# Patient Record
Sex: MALE | Race: WHITE | NOT HISPANIC OR LATINO | Employment: FULL TIME | URBAN - METROPOLITAN AREA
[De-identification: names, ages, dates, MRNs, and addresses within clinical notes are randomized per-mention and may not be internally consistent; named-entity substitution may affect disease eponyms.]

---

## 2017-01-10 ENCOUNTER — DOCUMENTATION ONLY (OUTPATIENT)
Dept: SLEEP MEDICINE | Facility: CLINIC | Age: 42
End: 2017-01-10

## 2017-01-10 NOTE — PROGRESS NOTES
30 DAY [facility] VISIT    Message left for patient to return call     Assessment: Pt meeting objective benchmarks.     Action plan: Waiting for patient to return call.  and Pt to have 6 month [facility] visit  Patient has a follow up visit with Dr. Culp on 1/18/2017.   Device settings:    EPAP Min Auto CPAP: 5.0 (The minimum allowable pressure in cmH2O)    EPAP Max Auto CPAP/: 15.0 (The maximum allowable pressure in cmH2O)    Target (95% of Target) 14 day average (Resmed): 8.3cm H20    Objective measures: 14 day rolling measures     Compliance   (Goal >70%)  --% compliance greater than four hours rolling average 14 days: 92.8 %      Leak   (Goal < 24 lpm) --95% OF Leak in litres Rolling Average 14 Days: 8.49 lpm  last data upload      AHI  (Goal < 5)  --AHI Rolling Average 14 Day: 0.92 last data upload        Usage  (Goal >240)  --Time mask on face 14 day average: 430 min

## 2017-01-12 ENCOUNTER — TEAM CONFERENCE (OUTPATIENT)
Dept: SLEEP MEDICINE | Facility: CLINIC | Age: 42
End: 2017-01-12

## 2017-01-12 NOTE — TELEPHONE ENCOUNTER
I called the patient and left a voice message that I am unavailable to see him for his f/u appointment for f/u on CPAP use on 1/18/17. I briefly( mentioned that the compliance DL looks good.  I have instructed him to call the clinic at [phone] to reschedule the visit, if he is interested.    (Compliance DL was personally reviewed  By me including the STM notes. Good compliance with CPAP and residual AHI 0.92 per hr, YESENIA is well controlled with CPAP; recommend him to continue the CPAP use regularly during sleep and get the supplies replaced regularly. He is scheduled for [facility] call in 6 months).    Sarah Culp MD   of Medicine,  Division of Pulmonary/Sleep Medicine  [facility].

## 2017-08-25 ENCOUNTER — OFFICE VISIT (OUTPATIENT)
Dept: UROLOGY | Facility: CLINIC | Age: 42
End: 2017-08-25
Payer: COMMERCIAL

## 2017-08-25 VITALS — SYSTOLIC BLOOD PRESSURE: 112 MMHG | HEART RATE: 66 BPM | DIASTOLIC BLOOD PRESSURE: 76 MMHG | RESPIRATION RATE: 11 BRPM

## 2017-08-25 PROCEDURE — 99212 OFFICE O/P EST SF 10 MIN: CPT | Performed by: UROLOGY

## 2017-08-25 RX ORDER — TADALAFIL 20 MG/1
20 TABLET ORAL DAILY PRN
Qty: 30 TABLET | Refills: 3 | Status: SHIPPED | OUTPATIENT
Start: 2017-08-25 | End: 2021-04-22

## 2017-08-25 NOTE — NURSING NOTE
Chief Complaint   Patient presents with     RECHECK       Initial /76 (BP Location: Right arm, Patient Position: Chair, Cuff Size: Adult Regular)  Pulse 66  Resp 11 Estimated body mass index is 27.29 kg/(m^2) as calculated from the following:    Height as of 11/16/16: 1.829 m (6').    Weight as of 11/16/16: 91.3 kg (201 lb 3.2 oz).  Medication Reconciliation: complete   Aida Rubin RN

## 2017-08-25 NOTE — PROGRESS NOTES
Chief Complaint   Patient presents with     RECHECK       Mik León is a 41 year old male who presents today for follow up of   Chief Complaint   Patient presents with     RECHECK    f/u for ED.  He has been using cialis with good results.  He denies any side effects from medication.  He denies any CP/SOB.  He uses 5 mg of cialis usually over the weekend.      Current Outpatient Prescriptions   Medication Sig Dispense Refill     tadalafil (CIALIS) 20 MG tablet Take 1 tablet (20 mg) by mouth daily as needed for erectile dysfunction 30 tablet 3     order for DME Equipment being ordered: CPAP Equipment ordered: RESMED Auto PAP Mask type: Nasal Settings: 5-15 CM H2O       tadalafil (CIALIS) 5 MG tablet Take 1 tablet (5 mg) by mouth as needed for erectile dysfunction 30 tablet 0     [DISCONTINUED] tadalafil (CIALIS) 20 MG tablet Take 1 tablet (20 mg) by mouth daily as needed for erectile dysfunction 30 tablet 3     Allergies   Allergen Reactions     Penicillin G Rash      No past medical history on file.  No past surgical history on file.  No family history on file.  History     Social History     Marital Status:      Spouse Name: N/A     Number of Children: N/A     Years of Education: N/A     Social History Main Topics     Smoking status: Never Smoker      Smokeless tobacco: Never Used     Alcohol Use: None     Drug Use: None     Sexually Active: None     Other Topics Concern     None     Social History Narrative       REVIEW OF SYSTEMS  =================  C: NEGATIVE for fever, chills, change in weight  I: NEGATIVE for worrisome rashes, moles or lesions  E/M: NEGATIVE for ear, mouth and throat problems  R: NEGATIVE for significant cough or SHORTNESS OF BREATH,   CV: NEGATIVE for chest pain, palpitations or peripheral edema  GI: NEGATIVE for nausea, abdominal pain, heartburn, or change in bowel habits  NEURO: NEGATIVE any motor/sensory changes  PSYCH: NEGATIVE for recent mood disorder    Physical Exam:  BP  112/76 (BP Location: Right arm, Patient Position: Chair, Cuff Size: Adult Regular)  Pulse 66  Resp 11   Patient is pleasant, in no acute distress, good general condition.  Lung: no evidence of respiratory distress    Abdomen: Soft, nondistended, non tender. No masses. No rebound or guarding.   Exam: no cva tenderness  Skin: Warm and dry.  No redness.  Psych: normal mood and affect  Neuro: alert and oriented    Assessment/Plan:   (794.62) Male impotence  Comment:    Plan: tadalafil (CIALIS) 5 MG tablet and 20 mg prn           See in 1 year.

## 2017-08-25 NOTE — MR AVS SNAPSHOT
"              After Visit Summary   2017    Mik León    MRN: [id number]           Patient Information     Date Of Birth          1975        Visit Information        Provider Department      2017 8:00 AM Alex Posada MD [facility]        Today's Diagnoses     Male impotence           Follow-ups after your visit        Who to contact     If you have questions or need follow up information about today's clinic visit or your schedule please contact [facility] directly at [phone].  Normal or non-critical lab and imaging results will be communicated to you by MyChart, letter or phone within 4 business days after the clinic has received the results. If you do not hear from us within 7 days, please contact the clinic through Tingzhart or phone. If you have a critical or abnormal lab result, we will notify you by phone as soon as possible.  Submit refill requests through ITema or call your pharmacy and they will forward the refill request to us. Please allow 3 business days for your refill to be completed.          Additional Information About Your Visit        MyChart Information     ITema lets you send messages to your doctor, view your test results, renew your prescriptions, schedule appointments and more. To sign up, go to [email or web address] . Click on \"Log in\" on the left side of the screen, which will take you to the Welcome page. Then click on \"Sign up Now\" on the right side of the page.     You will be asked to enter the access code listed below, as well as some personal information. Please follow the directions to create your username and password.     Your access code is: [email or web address]  Expires: 2017  8:22 AM     Your access code will  in 90 days. If you need help or a new code, please call your [facility] or [phone].        Care EveryWhere ID     This is your Care EveryWhere ID. This could be used by other organizations to " access your [place] medical records  [id number]        Your Vitals Were     Pulse Respirations                66 11           Blood Pressure from Last 3 Encounters:   08/25/17 112/76   12/06/16 120/74   11/16/16 113/55    Weight from Last 3 Encounters:   11/16/16 91.3 kg (201 lb 3.2 oz)   08/06/14 104.7 kg (230 lb 12.8 oz)   09/07/12 106.6 kg (235 lb)              Today, you had the following     No orders found for display         Where to get your medicines      These medications were sent to [id number] - [place], [place] - [address] AT [facility] [address]  [address], [place] [address]     Phone:  [phone]     tadalafil 20 MG tablet          Primary Care Provider Office Phone # Fax #    Callum Silva [phone]       [address] [facility] [address]        Equal Access to Services     LATRELL LEWIS : Hadii aad ku hadasho Soomaali, waaxda luqadaha, qaybta kaalmada adeegyada, waxay idiin hayaan gaileg kharajeannie goldberg . So [facility] [phone].    ATENCIÓN: Si habla español, tiene a pereira disposición servicios gratuitos de asistencia lingüística. Llame al [phone].    We comply with applicable federal civil rights laws and [place] laws. We do not discriminate on the basis of race, color, national origin, age, disability sex, sexual orientation or gender identity.            Thank you!     Thank you for choosing [facility]  for your care. Our goal is always to provide you with excellent care. Hearing back from our patients is one way we can continue to improve our services. Please take a few minutes to complete the written survey that you may receive in the mail after your visit with us. Thank you!             Your Updated Medication List - Protect others around you: Learn how to safely use, store and throw away your medicines at www.disposemymeds.org.          This list is accurate as of: 8/25/17  8:22 AM.   Always use your most recent med list.                   Brand Name Dispense Instructions for use Diagnosis    order for DME      Equipment being ordered: CPAP Equipment ordered: RESMED Auto PAP Mask type: Nasal Settings: 5-15 CM H2O        * tadalafil 5 MG tablet    CIALIS    30 tablet    Take 1 tablet (5 mg) by mouth as needed for erectile dysfunction    Male impotence       * tadalafil 20 MG tablet    CIALIS    30 tablet    Take 1 tablet (20 mg) by mouth daily as needed for erectile dysfunction    Male impotence       * Notice:  This list has 2 medication(s) that are the same as other medications prescribed for you. Read the directions carefully, and ask your doctor or other care provider to review them with you.

## 2018-05-25 ENCOUNTER — TELEPHONE (OUTPATIENT)
Dept: UROLOGY | Facility: CLINIC | Age: 43
End: 2018-05-25

## 2018-05-25 NOTE — TELEPHONE ENCOUNTER
PA Initiation    Medication: cilais 20mg  Insurance Company: [employer] - Phone [phone] Fax [phone]  Pharmacy Filling the Rx: [id number] - [place], [place] - [address] AT [facility] [address]  Filling Pharmacy Phone: [phone]  Filling Pharmacy Fax:    Start Date: 5/25/2018    Central Prior Authorization Team   Phone: [phone]

## 2018-05-25 NOTE — TELEPHONE ENCOUNTER
Prior Authorization Retail Medication Request    Medication/Dose: Cialis 20 mg   ICD code (if different than what is on RX):  N52.9 Male impotence   Previously Tried and Failed:  NA  Rationale:  Working well for him     Insurance Name:  [employer]   Insurance ID: [id number]       Pharmacy Information (if different than what is on RX)  Name:    Phone:

## 2018-05-29 NOTE — TELEPHONE ENCOUNTER
Prior Authorization Approval    Authorization Effective Date: 4/25/2018  Authorization Expiration Date: 5/25/2019  Medication: cilais 20mg Approved  Approved Dose/Quantity: 30/30 days  Reference #: [id number]   Insurance Company: [employer] - Phone [phone] Fax [phone]  Which Pharmacy is filling the prescription (Not needed for infusion/clinic administered): [id number] - [place], [place] - [address] AT [facility] [address] [facility]  Pharmacy Notified: Yes  Patient Notified: Yes

## 2021-03-23 ENCOUNTER — NURSE TRIAGE (OUTPATIENT)
Dept: NURSING | Facility: CLINIC | Age: 46
End: 2021-03-23

## 2021-03-23 NOTE — TELEPHONE ENCOUNTER
Mik would like to schedule an appointment with PCP/provider. Needs a recent visit with provider as he needs CPAAP supplies (base covering for his face).    Has not seen primary care for years. Seen by Sleep MD in 2017.  CPAP machine brand: Resmed  Has an account with  [facility] [employer].    Per [facility] - needs:  - active prescription for CPAP supplies  - new visit with sleep provider/PCP for a new Rx    Also would have patient call them with updated address and insurance information.  Unsure if needing sleep study, staff said he might need one.    FNA called Bill with information and he verbalized understanding. FNA recommended to get seen directly by sleep MD. Warm transferred to  for sleep appointment.    Vandana Rowan RN/[place] Nurse Advisor               Reason for Disposition    Nursing judgment    Protocols used: NO GUIDELINE OR REFERENCE [id number]

## 2021-04-04 ENCOUNTER — HEALTH MAINTENANCE LETTER (OUTPATIENT)
Age: 46
End: 2021-04-04

## 2021-04-22 VITALS — WEIGHT: 201 LBS | BODY MASS INDEX: 27.22 KG/M2 | HEIGHT: 72 IN

## 2021-04-22 ASSESSMENT — MIFFLIN-ST. JEOR: SCORE: 1829.73

## 2021-04-22 NOTE — PROGRESS NOTES
Vinod is a 45 year old who is being evaluated via a billable telephone visit.      What phone number would you like to be contacted at? [phone]  How would you like to obtain your AVS? Arnaldoharmason     Telephone visit start time: 9:43 AM  Telephone visit end time: 10:04 AM      [facility]  Sleep Consultation Note     Date on this visit: 2021    Mik León is sent by No ref. provider found for a sleep consultation regarding sleep apnea    Primary Physician: Callum Silva     Chief complaint: want new supplies for CPAP and wants a nasal pillow mask    Mik León 45 year old male with h/o severe YESENIA diagnosed in 2016, who presents to sleep clinic for telephone visit today for follow-up of previously diagnosed obstructive sleep apnea. He requests prescription for new CPAP supplies and wants to obtain nasal pillow mask.  He has had a previous sleep study.    Sleep Disordered Breathing  He has only been using CPAP intermittently and stopped using since .  He may used it one other time during last winter. His mask is old, full face mask. Has not had replaced since he initially obtained.  Mask does not seal well. Wants to try nasal pillow mask.  Had positive benefits, with better sleep quality with CPAP.  There were no reports of snoring while he was using CPAP.  He tolerated the pressure setting well, it was really the mask part of it which he did not like.  He is hoping that if he has  nasal pillow mask that his compliance will improve.    Without CPAP, he reports snoring and witnessed apneas. He denies waking up gasping/choking.   He reports non-refreshing sleep.and occasional fatigue.  No EDS. No drowsiness driving.    Weight during PSlbs  Current weight:210 lbs(pt reported)    Previous sleep study report:  Study Date: 16               Weight : 201.0 lbs.    Diagnostic PSG    Sleep Architecture: Severe sleep disruption attributable to respiratory events without hypoxemia.    The total recording time of the diagnostic portion of the study was 209.8 minutes.  The total sleep time was 164.0 minutes.  During the diagnostic portion of the study the sleep latency was normal  at 14.3 minutes without the use of a sleep aid.  REM latency was 59.5 minutes.  Arousal index was increased at 40.2 arousals per hour.  Sleep efficiency was decreased at 78.2%.  Wake after sleep onset was 29.0 minutes.   The patient spent 12.2% of total sleep time in Stage N1, 60.4% in Stage N2, 11.0% in Stage N3 and 16.5% in REM.        Respiration: Severe obstructive sleep apnea.    Events - During the diagnostic portion of the study, the polysomnogram revealed a presence of 100 obstructive, 6 central, and 2 mixed apneas resulting in an apnea index of 39.5 events per hour.  There were 41 hypopneas resulting in a hypopnea index of 15.0 events per hour.  The combined apnea/hypopnea index was 54.5 events per hour.  The REM AHI was 51.1 events per hour.  The supine AHI was 76.0 events per hour.  The RERA index was - events per hour.  The RDI was 54.5 events per hour.     Snoring - was reported as loud\intermittent.    Respiratory rate and pattern - was notable for normal respiratory rate and pattern.    Sustained Sleep Associated Hypoventilation - Transcutaneous carbon dioxide monitoring was not used, however significant hypoventilation was not suggested by oximetry.    Sleep Associated Hypoxemia - (Greater than 5 minutes O2 sat below 89%) was not present.  Baseline oxygen saturation was 94.7%. Lowest oxygen saturation was 79.9%.  Time spent less than or equal to 88% was 3.6 minutes.  Time spent less than or equal to 89% was 6.2 minutes.     Treatment PSG  Sleep Architecture: Normalization of sleep continuity on CPAP.  At 02:14:51 AM the patient was placed on CPAP treatment and was titrated at pressures ranging from 5 cmH2O up to 5 cmH2O.  The total recording time of the treatment portion of the study was 225.0 minutes.   The total sleep time was 191.5 minutes.  During the treatment portion of the study the sleep latency was 12.0 minutes.  REM latency was 73.5 minutes.  Arousal index was normal at 8.5 arousals per hour.  Sleep efficiency was mildly decreased at 85.1%.  Wake after sleep onset was 21.5 minutes.  The patient spent 4.7% of total sleep time in Stage N1, 47.3% in Stage N2, 16.4% in Stage N3 and 31.6% in REM.        Respiration: Effective treatment of sleep apnea with CPAP.  The optimal pressure was 5 cmH2O with an AHI of 4.1 events per hour.  Time in REM supine on final pressure was 60.0 minutes.   This titration was considered optimal (residual AHI < 5 events per hour and including REM-supine sleep at final pressure).      Movement Activity: No abnormal sleep movements     Cardiac Summary: Normal sinus rhythm.    Assessment:     Severe obstructive sleep apnea with sleep disruption effectively treated with CPAP    DOWNLOADABLE COMPLIANCE DATA:   From 1/22/20 through 2/20/2020 reveals that he used the device for 6/30 days with an averag use of 7.33 hrs on the days used. 95th percentile on leak was 9 Lit/mt. Residual AHI was 1.7 per hour. Median pressure settings:7.9  ; 95th percentile :9.4 and max pressure: 10.4 cm water    Sleep Schedule/Sleep Complaints//Daytime Functioning  During workdays, iMk goes to bed at 1030PM and wakes up at 730AM.  It usually takes 5 minutes to fall asleep.  On non-work days, He falls asleep at 12 [place] and gets up 8 AM.  He wakes up 1-2 times throughout the night.  Night time awakenings occurs for no apparent reason, once in a while uses bathroom.  After awakening, He is able to fall back asleep right away.  Patient denies drowsiness while driving   Mik does not nap    SLEEP SCALES:  Patient's [place] Sleepiness score 2/24   Insomnia severity index:0    Restless Legs symptoms  Mik does not complain of restlessness feelings in the legs.    Sleep Behaviors  He denies any cataplexy, sleep  hallucinations.  Reported very infrequent sleep paralysis  He denies any night time behaviors - sleep walking, sleep talking, sleep eating.  He does not complain acting out dreams.    Social History  Mik currently works full time M-Fri 8am-5pm.  , Lives with wife and two children  He drinks coffee about 1 drink/day. Last caffeine intake is sometimes within 6 hours of bed time.  He drinks alcohol, 1-2 drinks/month  Does not use alcohol as a sleep aid.  Patient is a never smoker. Patient does not use drugs. Does not use medical marijuana      Allergies:    Allergies   Allergen Reactions     Penicillin G Rash       Medications:    Current Outpatient Medications   Medication Sig Dispense Refill     order for DME Equipment being ordered: CPAP Equipment ordered: RESMED Auto PAP Mask type: Nasal Settings: 5-15 CM H2O         Problem List:  Patient Active Problem List    Diagnosis Date Noted     Severe obstructive sleep apnea 11/29/2016     Priority: Medium     Male impotence 10/05/2012     Priority: Medium     CARDIOVASCULAR SCREENING; LDL GOAL LESS THAN 160 10/31/2010     Priority: Medium        Past Medical/Surgical History:  YESENIA      Social History:  Social History     Socioeconomic History     Marital status:      Spouse name: Not on file     Number of children: Not on file     Years of education: Not on file     Highest education level: Not on file   Occupational History     Not on file   Social Needs     Financial resource strain: Not on file     Food insecurity     Worry: Not on file     Inability: Not on file     Transportation needs     Medical: Not on file     Non-medical: Not on file   Tobacco Use     Smoking status: Never Smoker     Smokeless tobacco: Never Used   Substance and Sexual Activity     Alcohol use: Not on file     Drug use: Not on file     Sexual activity: Not on file   Lifestyle     Physical activity     Days per week: Not on file     Minutes per session: Not on file     Stress:  Not on file   Relationships     Social connections     Talks on phone: Not on file     Gets together: Not on file     Attends [employer]alian service: Not on file     Active member of club or organization: Not on file     Attends meetings of clubs or organizations: Not on file     Relationship status: Not on file     Intimate partner violence     Fear of current or ex partner: Not on file     Emotionally abused: Not on file     Physically abused: Not on file     Forced sexual activity: Not on file   Other Topics Concern     Not on file   Social History Narrative     Not on file       Family History:  No family history on file.    Review of Systems:  Question: GENERAL HEALTH SYMPTOMS  Answer:   No     Question: SKIN SYMPTOMS  Answer:   No     Question: HEAD, EARS, NOSE AND THROAT SYMPTOMS  Answer:   No     Question: EYE SYMPTOMS  Answer:   No     Question: HEART SYMPTOMS  Answer:   No     Question: LUNG SYMPTOMS  Answer:   No     Question: INTESTINAL SYMPTOMS  Answer:   No     Question: URINARY SYMPTOMS  Answer:   No     Question: REPRODUCTIVE SYMPTOMS  Answer:   No     Question: SKELETAL SYMPTOMS  Answer:   No     Question: BLOOD SYMPTOMS  Answer:   No     Question: NERVOUS SYSTEM SYMPTOMS  Answer:   No     Question: MENTAL HEALTH SYMPTOMS  Answer:   No    Physical Examination:  Wt:230 lbs(per patient's report)    [place] Total Score 4/22/2021   Total score - [place] 2     General: No apparent distress  Chest: No cough, no audible wheezing, able to talk in full sentences  Psych: coherent speech, normal rate and volume, able to articulate logical thoughts, able   to abstract reason, no tangential thoughts, no hallucinations   or delusions  His  affect is normal  Neuro:  Mental status: Alert and  Oriented X 3  Speech: normal     Impression/Plan:    Previously diagnosed severe obstructive sleep apnea.  Patient has not used CPAP in a very long time and  reported sporadic CPAP use which he attributed to mask issues.  He is  "concerned about sleep apnea and is interested in resuming CPAP treatment and wants to obtain new supplies and is hoping that if he receives nasal pillow mask his compliance with improve.   We discussed the consequences of untreated sleep apnea.    Prescription was provided for renewal of all the CPAP supplies including nasal pillow mask per his request.  I have requested the [facility] medical DME provider to provide him information regarding how often he needs to get the supplies replaced.  Recommended him to use his CPAP regularly during sleep and instructed him to get the supplies replaced regularly.      He was encouraged to follow healthy diet, and exercise.    Patient was strongly advised to avoid driving, operating any heavy machinery or other hazardous situations while drowsy or sleepy.  Patient was counseled on the importance of driving while alert, to pull over if drowsy, or nap before getting into the vehicle if sleepy.      Plan is to follow-up via virtual visit in 5 to 6 weeks after he resumes his CPAP treatment when compliance measures were reviewed.    CC: Callum Silva MD    The above note was dictated using voice recognition software. Although reviewed after completion, some word and grammatical error may remain . Please contact the author for any clarifications.    \"I spent a total of  30 minutes with Mik León during today's telephone visit, most of this time was spent counseling the patient and  coordinating care regarding  YESENIA, CPAP therapy, PAP compliance , consequences of untreated sleep apnea,  weight management , going over PAP download/sleep study and chart review., including documentation and further activities as noted above.\" \"     Sarah Culp MD  [facility]   Floor 1, [address]   [address] [place], [place] [address] [address]   Appointments: [phone]                   "

## 2021-04-22 NOTE — PATIENT INSTRUCTIONS
Your BMI is Body mass index is 27.26 kg/m .  Weight management is a personal decision.  If you are interested in exploring weight loss strategies, the following discussion covers the approaches that may be successful. Body mass index (BMI) is one way to tell whether you are at a healthy weight, overweight, or obese. It measures your weight in relation to your height.  A BMI of 18.5 to 24.9 is in the healthy range. A person with a BMI of 25 to 29.9 is considered overweight, and someone with a BMI of 30 or greater is considered obese. More than two-thirds of American adults are considered overweight or obese.  Being overweight or obese increases the risk for further weight gain. Excess weight may lead to heart disease and diabetes.  Creating and following plans for healthy eating and physical activity may help you improve your health.  Weight control is part of healthy lifestyle and includes exercise, emotional health, and healthy eating habits. Careful eating habits lifelong are the mainstay of weight control. Though there are significant health benefits from weight loss, long-term weight loss with diet alone may be very difficult to achieve- studies show long-term success with dietary management in less than 10% of people. Attaining a healthy weight may be especially difficult to achieve in those with severe obesity. In some cases, medications, devices and surgical management might be considered.  What can you do?  If you are overweight or obese and are interested in methods for weight loss, you should discuss this with your provider.     Consider reducing daily calorie intake by 500 calories.     Keep a food journal.     Avoiding skipping meals, consider cutting portions instead.    Diet combined with exercise helps maintain muscle while optimizing fat loss. Strength training is particularly important for building and maintaining muscle mass. Exercise helps reduce stress, increase energy, and improves fitness.  Increasing exercise without diet control, however, may not burn enough calories to loose weight.       Start walking three days a week 10-20 minutes at a time    Work towards walking thirty minutes five days a week     Eventually, increase the speed of your walking for 1-2 minutes at time    In addition, we recommend that you review healthy lifestyles and methods for weight loss available through the National Institutes of Health patient information sites:  http://win.niddk.nih.gov/publications/index.htm    And look into health and wellness programs that may be available through your health insurance provider, employer, local community center, or alka club.    Weight management plan: Patient was referred to their PCP to discuss a diet and exercise plan.

## 2021-04-23 ENCOUNTER — TELEPHONE (OUTPATIENT)
Dept: SLEEP MEDICINE | Facility: CLINIC | Age: 46
End: 2021-04-23

## 2021-04-23 ENCOUNTER — VIRTUAL VISIT (OUTPATIENT)
Dept: SLEEP MEDICINE | Facility: CLINIC | Age: 46
End: 2021-04-23
Payer: COMMERCIAL

## 2021-04-23 DIAGNOSIS — G47.33 OSA ON CPAP: Primary | ICD-10-CM

## 2021-04-23 PROCEDURE — 99203 OFFICE O/P NEW LOW 30 MIN: CPT | Mod: GT | Performed by: INTERNAL MEDICINE

## 2021-08-24 NOTE — PROGRESS NOTES
SUBJECTIVE:   CC: Mik León is an 45 year old male who presents for preventative health visit.   Patient has been advised of split billing requirements and indicates understanding: Yes  Healthy Habits:     Getting at least 3 servings of Calcium per day:  Yes    Bi-annual eye exam:  NO    Dental care twice a year:  Yes    Sleep apnea or symptoms of sleep apnea:  Sleep apnea    Diet:  Other    Frequency of exercise:  2-3 days/week    Duration of exercise:  15-30 minutes    Taking medications regularly:  Not Applicable    PHQ-2 Total Score: 0    Additional concerns today:  No    Preventive -     Immunization History   Administered Date(s) Administered     COVID-19,PF,Moderna 04/19/2021     Influenza Vaccine IM > 6 months Valent IIV4 10/08/2020       -STD screen: declines      - Colon CA screen: at age 50     - Prostate CA screen: Discussed controversy about screening.   No results found for: PSA  Father has hx of prostate cancer at age 75   - Hep C screen: ordered     - Advanced Directive: referred today    -lipids screen: ordered     Diabetes screen: ordered         SH:  Marital status:   Kids: 2 , 2 step kids   Employment: [employer]   Exercise: walk 30 min per day   Tobacco: no  Etoh: once per week   Recreational drugs: no  Caffeine: coffee       Wt Readings from Last 4 Encounters:   08/27/21 102.1 kg (225 lb)   04/22/21 91.2 kg (201 lb)   11/16/16 91.3 kg (201 lb 3.2 oz)   08/06/14 104.7 kg (230 lb 12.8 oz)           Today's PHQ-2 Score:   PHQ-2 ( 1999 Pfizer) 8/26/2021   Q1: Little interest or pleasure in doing things 0   Q2: Feeling down, depressed or hopeless 0   PHQ-2 Score 0   Q1: Little interest or pleasure in doing things Not at all   Q2: Feeling down, depressed or hopeless Not at all   PHQ-2 Score 0       Abuse: Current or Past(Physical, Sexual or Emotional)- No  Do you feel safe in your environment? Yes    Have you ever done Advance Care Planning? (For example, a Health Directive, POLST, or  a discussion with a medical provider or your loved ones about your wishes): No, advance care planning information given to patient to review.  Advanced care planning was discussed at today's visit.    Social History     Tobacco Use     Smoking status: Never Smoker     Smokeless tobacco: Never Used   Substance Use Topics     Alcohol use: Yes     Comment: Average one drink a week         Alcohol Use 8/26/2021   Prescreen: >3 drinks/day or >7 drinks/week? No       Last PSA: No results found for: PSA    Reviewed orders with patient. Reviewed health maintenance and updated orders accordingly - Yes  Lab work is in process  BP Readings from Last 3 Encounters:   08/27/21 116/79   08/25/17 112/76   12/06/16 120/74    Wt Readings from Last 3 Encounters:   08/27/21 102.1 kg (225 lb)   04/22/21 91.2 kg (201 lb)   11/16/16 91.3 kg (201 lb 3.2 oz)                  Patient Active Problem List   Diagnosis     CARDIOVASCULAR SCREENING; LDL GOAL LESS THAN 160     Male impotence     Severe obstructive sleep apnea     Past Surgical History:   Procedure Laterality Date     wisdom teeth         Social History     Tobacco Use     Smoking status: Never Smoker     Smokeless tobacco: Never Used   Substance Use Topics     Alcohol use: Yes     Comment: Average one drink a week     Family History   Problem Relation Age of Onset     Breast Cancer Mother      Prostate Cancer Father         75 diagnosed with prostrate cancer     Other Cancer Maternal Grandfather      Other Cancer Paternal Grandfather          Current Outpatient Medications   Medication Sig Dispense Refill     order for DME Equipment being ordered: CPAP Equipment ordered: RESMED Auto PAP Mask type: Nasal Settings: 5-15 CM H2O       Allergies   Allergen Reactions     Penicillin G Rash     No lab results found.     Reviewed and updated as needed this visit by clinical staff  Tobacco  Allergies  Meds  Problems  Med Hx  Surg Hx  Fam Hx  Soc Hx          Reviewed and updated as  "needed this visit by Provider                Past Medical History:   Diagnosis Date     YESENIA (obstructive sleep apnea)       Past Surgical History:   Procedure Laterality Date     wisdom teeth         Review of Systems   Constitutional: Negative for chills and fever.   HENT: Negative for congestion, ear pain, hearing loss and sore throat.    Eyes: Negative for pain and visual disturbance.   Respiratory: Negative for cough and shortness of breath.    Cardiovascular: Negative for chest pain, palpitations and peripheral edema.   Gastrointestinal: Negative for abdominal pain, constipation, diarrhea, heartburn, hematochezia and nausea.   Genitourinary: Negative for discharge, dysuria, frequency, genital sores, hematuria, impotence and urgency.   Musculoskeletal: Negative for arthralgias, joint swelling and myalgias.   Skin: Negative for rash.   Neurological: Negative for dizziness, weakness, headaches and paresthesias.   Psychiatric/Behavioral: Negative for mood changes. The patient is not nervous/anxious.          OBJECTIVE:   /79   Pulse 75   Temp 97.2  F (36.2  C) (Tympanic)   Ht 1.854 m (6' 1\")   Wt 102.1 kg (225 lb)   SpO2 95%   BMI 29.69 kg/m      Physical Exam  GENERAL: healthy, alert and no distress  NECK: no adenopathy, no asymmetry, masses, or scars and thyroid normal to palpation  RESP: lungs clear to auscultation - no rales, rhonchi or wheezes  CV: regular rate and rhythm, normal S1 S2, no S3 or S4, no murmur, click or rub, no peripheral edema and peripheral pulses strong  ABDOMEN: soft, nontender, no hepatosplenomegaly, no masses and bowel sounds normal  MS: no gross musculoskeletal defects noted, no edema        ASSESSMENT/PLAN:   (Z00.00) Routine general medical examination at a health care facility  (primary encounter diagnosis)  Comment: Care reviewed and updated.  Repeat every year  Plan: HIV Antigen Antibody Combo, Hepatitis C Screen         Reflex to HCV RNA Quant and Genotype, Lipid        " " panel reflex to direct LDL Fasting, GLUCOSE        Patient has been advised of split billing requirements and indicates understanding: Yes  COUNSELING:   Reviewed preventive health counseling, as reflected in patient instructions    Estimated body mass index is 29.69 kg/m  as calculated from the following:    Height as of this encounter: 1.854 m (6' 1\").    Weight as of this encounter: 102.1 kg (225 lb).     Weight management plan: Discussed healthy diet and exercise guidelines    He reports that he has never smoked. He has never used smokeless tobacco.      Counseling Resources:  ATP IV Guidelines  Pooled Cohorts Equation Calculator  FRAX Risk Assessment  ICSI Preventive Guidelines  Dietary Guidelines for Americans, 2010  USDA's MyPlate  ASA Prophylaxis  Lung CA Screening    Iva Ordonez MD  [facility]  "

## 2021-08-26 ASSESSMENT — ENCOUNTER SYMPTOMS
HEMATOCHEZIA: 0
NERVOUS/ANXIOUS: 0
FEVER: 0
JOINT SWELLING: 0
HEARTBURN: 0
CHILLS: 0
DIARRHEA: 0
PALPITATIONS: 0
FREQUENCY: 0
DIZZINESS: 0
COUGH: 0
NAUSEA: 0
WEAKNESS: 0
CONSTIPATION: 0
ARTHRALGIAS: 0
DYSURIA: 0
PARESTHESIAS: 0
SHORTNESS OF BREATH: 0
SORE THROAT: 0
HEMATURIA: 0
HEADACHES: 0
EYE PAIN: 0
ABDOMINAL PAIN: 0
MYALGIAS: 0

## 2021-08-27 ENCOUNTER — OFFICE VISIT (OUTPATIENT)
Dept: FAMILY MEDICINE | Facility: CLINIC | Age: 46
End: 2021-08-27
Payer: COMMERCIAL

## 2021-08-27 VITALS
HEART RATE: 75 BPM | HEIGHT: 73 IN | OXYGEN SATURATION: 95 % | WEIGHT: 225 LBS | BODY MASS INDEX: 29.82 KG/M2 | SYSTOLIC BLOOD PRESSURE: 116 MMHG | TEMPERATURE: 97.2 F | DIASTOLIC BLOOD PRESSURE: 79 MMHG

## 2021-08-27 DIAGNOSIS — Z00.00 ROUTINE GENERAL MEDICAL EXAMINATION AT A HEALTH CARE FACILITY: Primary | ICD-10-CM

## 2021-08-27 DIAGNOSIS — E78.00 ELEVATED LDL CHOLESTEROL LEVEL: ICD-10-CM

## 2021-08-27 LAB
CHOLEST SERPL-MCNC: 245 MG/DL
FASTING STATUS PATIENT QL REPORTED: YES
FASTING STATUS PATIENT QL REPORTED: YES
GLUCOSE BLD-MCNC: 108 MG/DL (ref 70–99)
HCV AB SERPL QL IA: NONREACTIVE
HDLC SERPL-MCNC: 45 MG/DL
HIV 1+2 AB+HIV1 P24 AG SERPL QL IA: NONREACTIVE
LDLC SERPL CALC-MCNC: 178 MG/DL
NONHDLC SERPL-MCNC: 200 MG/DL
TRIGL SERPL-MCNC: 109 MG/DL

## 2021-08-27 PROCEDURE — 99386 PREV VISIT NEW AGE 40-64: CPT | Performed by: FAMILY MEDICINE

## 2021-08-27 PROCEDURE — 87389 HIV-1 AG W/HIV-1&-2 AB AG IA: CPT | Performed by: FAMILY MEDICINE

## 2021-08-27 PROCEDURE — 80061 LIPID PANEL: CPT | Performed by: FAMILY MEDICINE

## 2021-08-27 PROCEDURE — 36415 COLL VENOUS BLD VENIPUNCTURE: CPT | Performed by: FAMILY MEDICINE

## 2021-08-27 PROCEDURE — 86803 HEPATITIS C AB TEST: CPT | Performed by: FAMILY MEDICINE

## 2021-08-27 PROCEDURE — 82947 ASSAY GLUCOSE BLOOD QUANT: CPT | Performed by: FAMILY MEDICINE

## 2021-08-27 ASSESSMENT — ENCOUNTER SYMPTOMS
FEVER: 0
DIZZINESS: 0
CONSTIPATION: 0
COUGH: 0
FREQUENCY: 0
HEADACHES: 0
HEARTBURN: 0
ARTHRALGIAS: 0
CHILLS: 0
HEMATURIA: 0
JOINT SWELLING: 0
SHORTNESS OF BREATH: 0
EYE PAIN: 0
PARESTHESIAS: 0
HEMATOCHEZIA: 0
NERVOUS/ANXIOUS: 0
DYSURIA: 0
SORE THROAT: 0
MYALGIAS: 0
PALPITATIONS: 0
WEAKNESS: 0
NAUSEA: 0
DIARRHEA: 0
ABDOMINAL PAIN: 0

## 2021-08-27 ASSESSMENT — MIFFLIN-ST. JEOR: SCORE: 1954.47

## 2021-08-27 ASSESSMENT — PAIN SCALES - GENERAL: PAINLEVEL: NO PAIN (0)

## 2021-08-30 ENCOUNTER — TELEPHONE (OUTPATIENT)
Dept: FAMILY MEDICINE | Facility: CLINIC | Age: 46
End: 2021-08-30

## 2021-08-30 RX ORDER — ROSUVASTATIN CALCIUM 20 MG/1
20 TABLET, COATED ORAL DAILY
Qty: 90 TABLET | Refills: 1 | Status: SHIPPED | OUTPATIENT
Start: 2021-08-30

## 2021-08-30 NOTE — TELEPHONE ENCOUNTER
RN contacted pt and relayed provider messages below. Pt states he will do some more research before making a decision about the medication. Pt will send a [employer] message if he decides not to start the Crestor as ordered. RN sent pt additional information via [employer] regarding cholesterol results and medications.       ----- Message from Iva Ordonez MD sent at 8/30/2021  8:08 AM CDT -----  Please call patient make sure he is ok with this plan   LDL is very high at 178 , I recommend starting statin therapy and repeat lipids in 6 months     -LDL(bad) cholesterol level is elevated which can increase your heart disease risk.  A diet high in fat and simple carbohydrates, genetics and being overweight can contribute to this. ADVISE: exercising 150 minutes of aerobic exercise per week (30 minutes for 5 days per week or 50 minutes for 3 days per week are options) and eating a low saturated fat/low carbohydrate diet are helpful to improve this. Current guidelines from the American Heart Association support starting a cholesterol lowering medication to lower your heart and stroke disease risk.  I am sending a prescription to your pharmacy: rosuvastatin(Crestor) 20 mg each evening.  You can call your pharmacy to let them know you would like your prescription filled and if you have concerns about this recommendation then please contact me. In 6 months, you should recheck your fasting cholesterol panel by scheduling a lab-only appointment.     The 10-year ASCVD risk score (Akosua AISHWARYA Jr., et al., 2013) is: 3%    Values used to calculate the score:      Age: 45 years      Sex: Male      Is Non- : No      Diabetic: No      Tobacco smoker: No      Systolic Blood Pressure: 116 mmHg      Is BP treated: No      HDL Cholesterol: 45 mg/dL      Total Cholesterol: 245 mg/dL   Written by Iva Ordonez MD on 8/30/2021  8:08 AM CDT  Seen by patient Vinod AMATO Romelia on 8/30/2021  8:12 AM CDT    YUAN Robbins,  RN

## 2021-08-30 NOTE — PATIENT INSTRUCTIONS
Patient Education     Medicine for Cholesterol Control  Cholesterol is a waxy substance in your bloodstream. If there is too much of it in your blood, it can build up in the walls of your arteries. Over time, this buildup can lead to coronary disease. Coronary disease can put you at risk for a heart attack or stroke. It can also put you at risk for disease of the arteries in your legs and other places in your body. Medicine can give you the extra help you need to control your cholesterol.     How medicine helps  Different kinds of medicines help with cholesterol levels. Some help lower your LDL (bad cholesterol). Some help raise your HDL (good cholesterol). Other medicines lower your triglyceride levels. And some do all three. It may take some time to find the right medicine for you. Taking medicine will be only one part of your cholesterol control plan. You will still need to eat right and get regular exercise.   Talk with your healthcare provider to find out your risks for having a heart attack. Your provider can tell you what goals to use to see if your treatment is working. These goals may vary based on your health issues or family history. Also ask your provider how often your cholesterol should be checked as part of your treatment plan. You may need to fast before getting your cholesterol checked.   Taking your medicine  It is important to:    Tell your healthcare provider about any other medicines you take. This includes over-the-counter medicines. It also includes vitamins and herbs.    Take your medicine exactly as directed. This helps make sure that it works as it should.    Don't skip a dose.    Don't stop taking it if you feel better.    Don't stop taking it when your cholesterol numbers improve.    Order your refill before your medicine runs out.  Side effects  Medicines can cause side effects. These often occur at the start of taking a new medicine. Side effects can include headache and upset  stomach. Rarely you can have muscle aches. Tell your healthcare provider about any side effects you have.   When to call your healthcare provider  When taking your medicine, let your healthcare provider know if you have:     Yellowing of the whites of eyes    Blurred vision    Muscle aches    Trouble breathing    Pain in the right upper area of the abdomen  [employer] last reviewed this educational content on 11/1/2019 2000-2021 [employer]. All rights reserved. This information is not intended as a substitute for professional medical care. Always follow your healthcare professional's instructions.           Patient Education     Understanding Your Cholesterol Numbers     Blood flows easily when arteries are clear.      Less blood flows when cholesterol builds up in artery walls.   The higher your blood cholesterol, the greater your risk for heart attack, cardiovascular disease, or stroke. High cholesterol can cause any artery in your body to become narrow. That s why you need to know your cholesterol level. If it s high, you can take steps to bring it down. Eating the right foods and getting enough exercise can help. Some people also need medicine to control their cholesterol. Your healthcare provider can help you identify your personal risk through screenings tests and a discussion about different factors that many increase your chance for heart disease and stroke. These include family history, age, gender, ethnicity, and current health. Your healthcare provider can help you get started on a plan to control your cholesterol.  Checking your cholesterol  Your cholesterol is checked with a simple blood test. The results tell you how much cholesterol you have in your blood. Get checked as often as your healthcare provider suggests. Start monitoring your cholesterol levels regularly after age 20. Check it earlier if you have an increased risk for either high cholesterol or heart disease. If you have  "diabetes or high cholesterol, you may need your blood tested as often as every 3 months. As you work to lower your cholesterol, your numbers will change slowly. But they will change. Be patient and stay on track. Discuss your numbers with your healthcare provider.   Your total cholesterol number  A blood test will give you a number for the total amount of cholesterol in your blood. The higher this number, the more likely it is that cholesterol will build up in your blood vessels. Even if your cholesterol is just slightly high, you are at increased risk for health problems.  My total cholesterol is: ________________   Your lipid numbers  Total cholesterol is just one part of the story. Cholesterol is made up of different kinds of fats (lipids). If your total cholesterol is high, knowing your lipid profile is important. The 2 most important lipids are HDL and LDL. Your healthcare provider will tell you if you should fast before having your lipids checked. Fasting means you don t eat for a certain amount of time before the test is done. Along with cholesterol, triglyceride can also lead to blocked arteries. Triglycerides are another type of fat. Knowing your HDL, LDL, and triglyceride numbers, as well as your total cholesterol gives you a more complete picture of your cholesterol level:    HDL is called the  good  cholesterol. It moves the \"bad\" cholesterol out of the bloodstream and does not block your blood vessels. HDL levels are affected by how much you exercise and what you eat. This is the type of cholesterol that you don't want too little of. The higher the HDL, the better.My HDL cholesterol is:  ________________    LDL is called the  bad  cholesterol. This is because it can stick to your artery walls and block blood flow. LDL levels are most affected by what you eat and by your genes. LDL cholesterol is clustered with other cholesterol types including apolipoprotein B (apoB) and lipoprotein a [Lp(a)]. Higher " levels of these cholesterol types can increase your risk for atherosclerosis.My LDL cholesterol is:  ________________    Triglyceride is a type of fat the body uses to store energy. Too much triglyceride can increase your risk for heart disease. Triglyceride levels should be under 150. If your triglyceride level is above 200 mg/dL your provider may want to look at another cholesterol type called apolipoprotein B (apoB).My triglyceride is:  ________________  High cholesterol is only one of the big risk factor for heart disease heart attack, stroke, and peripheral artery disease. If your cholesterol levels are higher than normal, your healthcare provider will help you with steps to take to lower your levels. Steps may include lifestyle changes such as diet, physical activity, and quitting smoking. Your provider may also prescribe medicine to lower bad cholesterol levels. Based on your other health issues and risk factors, your healthcare provider may have specific goals for treating your cholesterol. You may need to use different kinds of medicines that work on the different types of cholesterol.  Some people may need to take medicines called statins to control their cholesterol. This is in addition to eating a healthy diet and getting regular exercise. Statins can help you stay healthy. They can also help prevent a heart attack or stroke. Also ask your provider about any side effects your medicines may cause. Let your provider know about any side effects you have. Make a plan to have regular cholesterol checks.  Work with your provider to know and understand what your cholesterol numbers mean, what your risk factors are and what are your treatment options and goals. Make sure you understand why these goals are important, based on your own health history and your family history of heart disease or high cholesterol. Stick with your treatment plan to reach the goals you set.  Keesha last reviewed this educational  content on 7/1/2019 2000-2021 [employer]. All rights reserved. This information is not intended as a substitute for professional medical care. Always follow your healthcare professional's instructions.

## 2021-09-19 ENCOUNTER — HEALTH MAINTENANCE LETTER (OUTPATIENT)
Age: 46
End: 2021-09-19

## 2022-02-14 ENCOUNTER — DOCUMENTATION ONLY (OUTPATIENT)
Dept: LAB | Facility: CLINIC | Age: 47
End: 2022-02-14
Payer: COMMERCIAL

## 2022-02-14 DIAGNOSIS — E78.5 HYPERLIPIDEMIA LDL GOAL <100: Primary | ICD-10-CM

## 2022-02-14 DIAGNOSIS — Z13.1 SCREENING FOR DIABETES MELLITUS: ICD-10-CM

## 2022-02-14 NOTE — PROGRESS NOTES
".Please place or confirm orders for upcoming lab appointment on 28/16/21. (schedling notes state \"repeat fasting lipids)  Thank You,  Nicky  "

## 2022-03-03 ENCOUNTER — OFFICE VISIT (OUTPATIENT)
Dept: FAMILY MEDICINE | Facility: CLINIC | Age: 47
End: 2022-03-03
Payer: COMMERCIAL

## 2022-03-03 VITALS
OXYGEN SATURATION: 96 % | BODY MASS INDEX: 29.82 KG/M2 | HEART RATE: 91 BPM | SYSTOLIC BLOOD PRESSURE: 136 MMHG | DIASTOLIC BLOOD PRESSURE: 84 MMHG | HEIGHT: 73 IN | TEMPERATURE: 97 F | WEIGHT: 225 LBS

## 2022-03-03 DIAGNOSIS — Z13.1 SCREENING FOR DIABETES MELLITUS: ICD-10-CM

## 2022-03-03 DIAGNOSIS — Z12.11 SCREEN FOR COLON CANCER: ICD-10-CM

## 2022-03-03 DIAGNOSIS — E78.5 HYPERLIPIDEMIA LDL GOAL <100: Primary | ICD-10-CM

## 2022-03-03 DIAGNOSIS — Z12.5 SCREENING FOR PROSTATE CANCER: ICD-10-CM

## 2022-03-03 LAB
ANION GAP SERPL CALCULATED.3IONS-SCNC: 4 MMOL/L (ref 3–14)
BUN SERPL-MCNC: 18 MG/DL (ref 7–30)
CALCIUM SERPL-MCNC: 9.4 MG/DL (ref 8.5–10.1)
CHLORIDE BLD-SCNC: 107 MMOL/L (ref 94–109)
CHOLEST SERPL-MCNC: 212 MG/DL
CO2 SERPL-SCNC: 29 MMOL/L (ref 20–32)
CREAT SERPL-MCNC: 1 MG/DL (ref 0.66–1.25)
FASTING STATUS PATIENT QL REPORTED: YES
GFR SERPL CREATININE-BSD FRML MDRD: >90 ML/MIN/1.73M2
GLUCOSE BLD-MCNC: 108 MG/DL (ref 70–99)
HBA1C MFR BLD: 5.5 % (ref 0–5.6)
HDLC SERPL-MCNC: 45 MG/DL
LDLC SERPL CALC-MCNC: 139 MG/DL
NONHDLC SERPL-MCNC: 167 MG/DL
POTASSIUM BLD-SCNC: 4.1 MMOL/L (ref 3.4–5.3)
PSA SERPL-MCNC: 1.08 UG/L (ref 0–4)
SODIUM SERPL-SCNC: 140 MMOL/L (ref 133–144)
TRIGL SERPL-MCNC: 140 MG/DL

## 2022-03-03 PROCEDURE — 80061 LIPID PANEL: CPT | Performed by: FAMILY MEDICINE

## 2022-03-03 PROCEDURE — 90471 IMMUNIZATION ADMIN: CPT | Performed by: FAMILY MEDICINE

## 2022-03-03 PROCEDURE — 36415 COLL VENOUS BLD VENIPUNCTURE: CPT | Performed by: FAMILY MEDICINE

## 2022-03-03 PROCEDURE — 80048 BASIC METABOLIC PNL TOTAL CA: CPT | Performed by: FAMILY MEDICINE

## 2022-03-03 PROCEDURE — 83036 HEMOGLOBIN GLYCOSYLATED A1C: CPT | Performed by: FAMILY MEDICINE

## 2022-03-03 PROCEDURE — G0103 PSA SCREENING: HCPCS | Performed by: FAMILY MEDICINE

## 2022-03-03 PROCEDURE — 90715 TDAP VACCINE 7 YRS/> IM: CPT | Performed by: FAMILY MEDICINE

## 2022-03-03 PROCEDURE — 99213 OFFICE O/P EST LOW 20 MIN: CPT | Mod: 25 | Performed by: FAMILY MEDICINE

## 2022-03-03 ASSESSMENT — PAIN SCALES - GENERAL: PAINLEVEL: NO PAIN (0)

## 2022-03-03 NOTE — PROGRESS NOTES
"  Assessment & Plan     Hyperlipidemia LDL goal <100  Patient is here for follow-up on high LDL, his LDL was 178 about 6 months ago.  ASCVD risk 4.4.  We will repeat lipid profile today if it is above 190 will start high intensity statin.  - Basic metabolic panel  (Ca, Cl, CO2, Creat, Gluc, K, Na, BUN)  - Lipid panel reflex to direct LDL Fasting    Screen for colon cancer    - Adult Gastro Ref - Procedure Only; Future    Screening for diabetes mellitus    - Hemoglobin A1c    Screening for prostate cancer    - PSA, screen; Future  - PSA, screen             BMI:   Estimated body mass index is 29.69 kg/m  as calculated from the following:    Height as of this encounter: 1.854 m (6' 1\").    Weight as of this encounter: 102.1 kg (225 lb).           Return in about 6 months (around 9/3/2022).    Iva Ordonez MD  [facility] SHAY Brock is a 46 year old who presents for the following health issues     History of Present Illness       Hyperlipidemia:  He presents for follow up of hyperlipidemia.  He is taking medication to lower cholesterol. He is not having myalgia or other side effects to statin medications.    He eats 2-3 servings of fruits and vegetables daily.He consumes 0 sweetened beverage(s) daily.He exercises with enough effort to increase his heart rate 9 or less minutes per day.  He exercises with enough effort to increase his heart rate 3 or less days per week.   He is taking medications regularly.     The 10-year ASCVD risk score (Akosuasudha NEFF Jr., et al., 2013) is: 4.4%    Values used to calculate the score:      Age: 46 years      Sex: Male      Is Non- : No      Diabetic: No      Tobacco smoker: No      Systolic Blood Pressure: 136 mmHg      Is BP treated: No      HDL Cholesterol: 45 mg/dL      Total Cholesterol: 245 mg/dL      Wt Readings from Last 4 Encounters:   03/03/22 102.1 kg (225 lb)   08/27/21 102.1 kg (225 lb)   04/22/21 91.2 kg (201 lb) " "  11/16/16 91.3 kg (201 lb 3.2 oz)         Review of Systems   Constitutional, HEENT, cardiovascular, pulmonary, gi and gu systems are negative, except as otherwise noted.      Objective    /84 (BP Location: Left arm, Patient Position: Sitting, Cuff Size: Adult Large)   Pulse 91   Temp 97  F (36.1  C) (Tympanic)   Ht 1.854 m (6' 1\")   Wt 102.1 kg (225 lb)   SpO2 96%   BMI 29.69 kg/m    Body mass index is 29.69 kg/m .  Physical Exam  Vitals and nursing note reviewed.   Constitutional:       General: He is not in acute distress.     Appearance: Normal appearance. He is not ill-appearing, toxic-appearing or diaphoretic.   Neurological:      Mental Status: He is alert.                        "

## 2022-03-03 NOTE — NURSING NOTE
Prior to immunization administration, verified patients identity using patient s name and date of birth. Please see Immunization Activity for additional information.     Screening Questionnaire for Adult Immunization    Are you sick today?   No   Do you have allergies to medications, food, a vaccine component or latex?   No   Have you ever had a serious reaction after receiving a vaccination?   No   Do you have a long-term health problem with heart, lung, kidney, or metabolic disease (e.g., diabetes), asthma, a blood disorder, no spleen, complement component deficiency, a cochlear implant, or a spinal fluid leak?  Are you on long-term aspirin therapy?   No   Do you have cancer, leukemia, HIV/AIDS, or any other immune system problem?   No   Do you have a parent, brother, or sister with an immune system problem?   No   In the past 3 months, have you taken medications that affect  your immune system, such as prednisone, other steroids, or anticancer drugs; drugs for the treatment of rheumatoid arthritis, Crohn s disease, or psoriasis; or have you had radiation treatments?   No   Have you had a seizure, or a brain or other nervous system problem?   No   During the past year, have you received a transfusion of blood or blood    products, or been given immune (gamma) globulin or antiviral drug?   No   For women: Are you pregnant or is there a chance you could become       pregnant during the next month?   No   Have you received any vaccinations in the past 4 weeks?   No     Immunization questionnaire answers were all negative.        Per orders of Dr. Ordonez, injection of Tdap given by Luba Powers CMA. Patient instructed to remain in clinic for 15 minutes afterwards, and to report any adverse reaction to me immediately.       Screening performed by Luba Powers CMA on 3/3/2022 at 8:16 AM.

## 2022-11-20 ENCOUNTER — HEALTH MAINTENANCE LETTER (OUTPATIENT)
Age: 47
End: 2022-11-20

## 2023-11-25 ENCOUNTER — HEALTH MAINTENANCE LETTER (OUTPATIENT)
Age: 48
End: 2023-11-25

## 2025-01-04 ENCOUNTER — HEALTH MAINTENANCE LETTER (OUTPATIENT)
Age: 50
End: 2025-01-04